# Patient Record
Sex: FEMALE | Race: WHITE | Employment: STUDENT | URBAN - METROPOLITAN AREA
[De-identification: names, ages, dates, MRNs, and addresses within clinical notes are randomized per-mention and may not be internally consistent; named-entity substitution may affect disease eponyms.]

---

## 2019-08-06 ENCOUNTER — APPOINTMENT (OUTPATIENT)
Dept: CARDIOLOGY | Facility: CLINIC | Age: 24
End: 2019-08-06
Attending: EMERGENCY MEDICINE
Payer: COMMERCIAL

## 2019-08-06 ENCOUNTER — HOSPITAL ENCOUNTER (EMERGENCY)
Facility: CLINIC | Age: 24
Discharge: HOME | End: 2019-08-06
Attending: EMERGENCY MEDICINE | Admitting: EMERGENCY MEDICINE
Payer: COMMERCIAL

## 2019-08-06 VITALS
TEMPERATURE: 97.8 F | OXYGEN SATURATION: 100 % | BODY MASS INDEX: 19.26 KG/M2 | WEIGHT: 130 LBS | DIASTOLIC BLOOD PRESSURE: 69 MMHG | RESPIRATION RATE: 16 BRPM | HEART RATE: 70 BPM | SYSTOLIC BLOOD PRESSURE: 94 MMHG | HEIGHT: 69 IN

## 2019-08-06 DIAGNOSIS — R55 SYNCOPE, UNSPECIFIED SYNCOPE TYPE: ICD-10-CM

## 2019-08-06 LAB
ANION GAP SERPL CALCULATED.3IONS-SCNC: 3 MMOL/L (ref 3–14)
BASOPHILS # BLD AUTO: 0 10E9/L (ref 0–0.2)
BASOPHILS NFR BLD AUTO: 0.6 %
BUN SERPL-MCNC: 11 MG/DL (ref 7–30)
CALCIUM SERPL-MCNC: 9.1 MG/DL (ref 8.5–10.1)
CHLORIDE SERPL-SCNC: 111 MMOL/L (ref 94–109)
CO2 SERPL-SCNC: 26 MMOL/L (ref 20–32)
CREAT SERPL-MCNC: 0.68 MG/DL (ref 0.52–1.04)
DIFFERENTIAL METHOD BLD: ABNORMAL
EOSINOPHIL # BLD AUTO: 0.2 10E9/L (ref 0–0.7)
EOSINOPHIL NFR BLD AUTO: 3.1 %
ERYTHROCYTE [DISTWIDTH] IN BLOOD BY AUTOMATED COUNT: 13.5 % (ref 10–15)
GFR SERPL CREATININE-BSD FRML MDRD: ABNORMAL ML/MIN/{1.73_M2}
GLUCOSE SERPL-MCNC: 83 MG/DL (ref 70–99)
HCT VFR BLD AUTO: 40.4 % (ref 35–47)
HGB BLD-MCNC: 14 G/DL (ref 11.7–15.7)
IMM GRANULOCYTES # BLD: 0 10E9/L (ref 0–0.4)
IMM GRANULOCYTES NFR BLD: 0.3 %
INTERPRETATION ECG - MUSE: NORMAL
LYMPHOCYTES # BLD AUTO: 1.4 10E9/L (ref 0.8–5.3)
LYMPHOCYTES NFR BLD AUTO: 19.1 %
MCH RBC QN AUTO: 30.7 PG (ref 26.5–33)
MCHC RBC AUTO-ENTMCNC: 34.7 G/DL (ref 31.5–36.5)
MCV RBC AUTO: 89 FL (ref 78–100)
MONOCYTES # BLD AUTO: 0.6 10E9/L (ref 0–1.3)
MONOCYTES NFR BLD AUTO: 8.8 %
NEUTROPHILS # BLD AUTO: 4.9 10E9/L (ref 1.6–8.3)
NEUTROPHILS NFR BLD AUTO: 68.1 %
PLATELET # BLD AUTO: 123 10E9/L (ref 150–450)
POTASSIUM SERPL-SCNC: 4.6 MMOL/L (ref 3.4–5.3)
RBC # BLD AUTO: 4.56 10E12/L (ref 3.8–5.2)
SODIUM SERPL-SCNC: 140 MMOL/L (ref 133–144)
WBC # BLD AUTO: 7.1 10E9/L (ref 4–11)

## 2019-08-06 PROCEDURE — 85025 COMPLETE CBC W/AUTO DIFF WBC: CPT | Performed by: EMERGENCY MEDICINE

## 2019-08-06 PROCEDURE — 99284 EMERGENCY DEPT VISIT MOD MDM: CPT

## 2019-08-06 PROCEDURE — 36415 COLL VENOUS BLD VENIPUNCTURE: CPT

## 2019-08-06 PROCEDURE — 93306 TTE W/DOPPLER COMPLETE: CPT | Mod: 26 | Performed by: INTERNAL MEDICINE

## 2019-08-06 PROCEDURE — 40000264 ECHOCARDIOGRAM COMPLETE

## 2019-08-06 PROCEDURE — 93005 ELECTROCARDIOGRAM TRACING: CPT

## 2019-08-06 PROCEDURE — 80048 BASIC METABOLIC PNL TOTAL CA: CPT | Performed by: EMERGENCY MEDICINE

## 2019-08-06 PROCEDURE — 25500064 ZZH RX 255 OP 636: Performed by: EMERGENCY MEDICINE

## 2019-08-06 RX ADMIN — HUMAN ALBUMIN MICROSPHERES AND PERFLUTREN 9 ML: 10; .22 INJECTION, SOLUTION INTRAVENOUS at 15:15

## 2019-08-06 ASSESSMENT — MIFFLIN-ST. JEOR: SCORE: 1585.06

## 2019-08-06 ASSESSMENT — ENCOUNTER SYMPTOMS
VOMITING: 0
NECK PAIN: 0
NAUSEA: 0
HEADACHES: 0

## 2019-08-06 NOTE — ED AVS SNAPSHOT
Emergency Department  [address] [place] [id number]  Phone:  [phone]  Fax:  [phone]                                    Tri Ragsdale   MRN: [id number]    Department:   Emergency Department   Date of Visit:  8/6/2019           After Visit Summary Signature Page    I have received my discharge instructions, and my questions have been answered. I have discussed any challenges I see with this plan with the nurse or doctor.    ..........................................................................................................................................  Patient/Patient Representative Signature      ..........................................................................................................................................  Patient Representative Print Name and Relationship to Patient    ..................................................               ................................................  Date                                   Time    ..........................................................................................................................................  Reviewed by Signature/Title    ...................................................              ..............................................  Date                                               Time          22EPIC Rev 08/18

## 2019-08-06 NOTE — ED NOTES
Pt had vagal response to IV start, blood obtained, pt refused new IV at this time, given water, juice and crackers to hydrate

## 2019-08-06 NOTE — ED PROVIDER NOTES
"  History     Chief Complaint:  Syncope    The history is provided by the patient.      Tri Ragsdale is a 23 year old adult who presents for evaluation after syncopal episode during a run. Patient states she was waiting at a red light in the middle of her run when she felt lightheaded prior to \"everything going black.\" She acknowledges a bystander witnessed her syncope and notes she fell onto her butt and was able to lower her upper body to the ground. Patient states she only lost consciousness for a couple seconds and was able to ambulate immediately afterwards. She details she runs every other day is currently training for a 10-mile run and denies any abnormal length or pace with her run today.    She denies any headache, nausea, vomiting, neck pain, additional injuries, concerns for dehydration, or current discomfort. No personal and family history of cardiac arrhythmia or early cardiogenic sudden death but father acknowledges her great grandfather required a valve replacement at a young age. Patient also had a vagal response during blood draw and affirms history of recurring syncope in her youth.     Allergies:  Sulfa drugs    Medications:    Medications reviewed. No current medications.     Past Medical History:    Medical history reviewed. No pertinent medical history.    Past Surgical History:    Surgical history reviewed. No pertinent surgical history.    Family History:    Family history reviewed. No pertinent family history.     Social History:  Accompanied by her father.  Never Smoker  Alcohol Use: Negative  Marital Status: Single      Review of Systems   Gastrointestinal: Negative for nausea and vomiting.   Musculoskeletal: Negative for gait problem and neck pain.   Neurological: Positive for syncope. Negative for headaches.   All other systems reviewed and are negative.    Physical Exam   Patient Vitals for the past 24 hrs:   BP Temp Temp src Pulse Heart Rate Resp SpO2 Height Weight   08/06/19 1430 " "-- -- -- -- 68 20 100 % -- --   08/06/19 1339 98/56 -- -- 66 -- -- 99 % -- --   08/06/19 1236 102/49 97.8  F (36.6  C) Oral 81 -- 20 100 % 1.753 m (5' 9\") 59 kg (130 lb)     Physical Exam  Vitals: reviewed by me  General: Pt seen on hospital [facility], pleasant, cooperative, and alert to conversation  Eyes: Tracking well, clear conjunctiva BL  ENT: MMM, midline trachea.  Full range of motion to C-spine, no evidence of trauma to head or neck.  Lungs:   No tachypnea, no accessory muscle use. No respiratory distress.   CV: Rate as above, regular rhythm.  No murmurs heard.  MSK: no peripheral edema or joint effusion.  No evidence of trauma  Skin: No rash, normal turgor and temperature  Neuro: Clear speech and no facial droop.  Psych: Not RIS, no e/o AH/VH      Emergency Department Course   ECG:  ECG taken at 1242  Sinus rhythm with sinus arrhythmia  Incomplete RBBB  Borderline ECG  Rate 78 bpm. HI interval 126 ms. QRS duration 96 ms. QT/QTc 384/437 ms. P-R-T axes 67 81 51.    Imaging:  Stress Echocardiogram  The visual ejection fraction is estimated at 50-55%.  Left ventricular systolic function is low normal.  No hemodynamically significant valvular aortic stenosis.  Right ventricular function may be mildly to moderately reduced but was never  well seen to make a definite assessment'  The right ventricle is normal size.  The study was technically difficult.    Laboratory:  CBC: WBC 7.1, HGB 14.0,  (L)  BMP: Cl 111 (L) o/w WNL (Creatinine 0.68)    Emergency Department Course:  1242 EKG obtained in the ED, see results above.   1340 IV was inserted and blood was drawn for laboratory testing, results above.  1403 Nursing notes and vitals reviewed.  1425 I performed an exam of the patient as documented above.   1448 The patient was sent for a echo while in the emergency department, results above.   1545 Patient reassessed and updated on work-up thus far.   1635 Spoke with the Echo Tech regarding findings.   1642 I " personally reviewed the laboratory and imaging results with the patient and answered all related questions prior to discharge, anticipatory guidance given.    Impression & Plan      Medical Decision Making:  A very pleasant 23 year old female presents to the emergency room with syncopal episode. She has a history of fainting and even fainted here during the IV draw, but quickly comes back to normal, and has normal vital signs here in the ER. She states she has no symptoms, the only concerning part of her history is that today's syncopal episode appears to have occurred proximate to exercise. She states she had finished running, stopped at a corner, and was waiting at a red light when she began to feel dizziness and did give a prodrome which is of course more reassuring, in addition to the fact she was not actively running when this happened. She has no cardiac murmur here, no arrhythmias noted on her EKG, and we were able to get a stat echo, thankfully given the fact this was again proximate to exercise, and there is no evidence of HOCM or valvular incompetency. The patient is resting comfortably, asking to leave, which I do think is reasonable. Will have her follow-up with her regular care doctor in the next week, may simply have been very hot out, certainly could have been dehydrated, and lab work was otherwise negative. Will discharge as above.      Diagnosis:    ICD-10-CM   1. Syncope, unspecified syncope type R55     Disposition: Home    Scribe Disclosure:  RICKY, Pipo Jimenezu, am serving as a scribe at 4:02 PM on 8/6/2019 to document services personally performed by Barney Porter MD based on my observations and the provider's statements to me.     EMERGENCY DEPARTMENT       Barney Porter MD  08/06/19 4717

## 2020-03-11 ENCOUNTER — HEALTH MAINTENANCE LETTER (OUTPATIENT)
Age: 25
End: 2020-03-11

## 2021-01-03 ENCOUNTER — HEALTH MAINTENANCE LETTER (OUTPATIENT)
Age: 26
End: 2021-01-03

## 2021-04-25 ENCOUNTER — HEALTH MAINTENANCE LETTER (OUTPATIENT)
Age: 26
End: 2021-04-25

## 2021-10-10 ENCOUNTER — HEALTH MAINTENANCE LETTER (OUTPATIENT)
Age: 26
End: 2021-10-10

## 2022-05-21 ENCOUNTER — HEALTH MAINTENANCE LETTER (OUTPATIENT)
Age: 27
End: 2022-05-21

## 2022-09-18 ENCOUNTER — HEALTH MAINTENANCE LETTER (OUTPATIENT)
Age: 27
End: 2022-09-18

## 2023-06-04 ENCOUNTER — HEALTH MAINTENANCE LETTER (OUTPATIENT)
Age: 28
End: 2023-06-04